# Patient Record
Sex: FEMALE | Race: WHITE | NOT HISPANIC OR LATINO | URBAN - METROPOLITAN AREA
[De-identification: names, ages, dates, MRNs, and addresses within clinical notes are randomized per-mention and may not be internally consistent; named-entity substitution may affect disease eponyms.]

---

## 2017-01-04 ENCOUNTER — APPOINTMENT (OUTPATIENT)
Age: 82
Setting detail: DERMATOLOGY
End: 2017-01-04

## 2017-01-04 ENCOUNTER — RX ONLY (RX ONLY)
Age: 82
End: 2017-01-04

## 2017-01-04 PROBLEM — C44.311 BASAL CELL CARCINOMA OF SKIN OF NOSE: Status: ACTIVE | Noted: 2017-01-04

## 2017-01-04 PROCEDURE — 17312 MOHS ADDL STAGE: CPT

## 2017-01-04 PROCEDURE — OTHER MIPS QUALITY: OTHER

## 2017-01-04 PROCEDURE — 15260 FTH/GFT FR N/E/E/L 20 SQCM/<: CPT

## 2017-01-04 PROCEDURE — 17311 MOHS 1 STAGE H/N/HF/G: CPT

## 2017-01-04 PROCEDURE — OTHER MOHS SURGERY: OTHER

## 2017-01-04 RX ORDER — CEPHALEXIN 500 MG/1
CAPSULE ORAL
Qty: 14 | Refills: 0 | Status: ERX | COMMUNITY
Start: 2017-01-04

## 2017-01-04 NOTE — PROCEDURE: MIPS QUALITY
Quality 358: Patient-Centered Surgical Risk Assessment And Communication: Documentation of patient-specific risk assessment with a risk calculator based on multi-institutional clinical data, the specific risk calculator used, and communication of risk assessment from risk calculator with the patient or family.
Detail Level: Detailed

## 2017-01-11 ENCOUNTER — APPOINTMENT (OUTPATIENT)
Age: 82
Setting detail: DERMATOLOGY
End: 2017-01-11

## 2017-01-11 DIAGNOSIS — Z48.02 ENCOUNTER FOR REMOVAL OF SUTURES: ICD-10-CM

## 2017-01-11 PROCEDURE — OTHER SUTURE REMOVAL (GLOBAL PERIOD): OTHER

## 2017-01-11 PROCEDURE — 99024 POSTOP FOLLOW-UP VISIT: CPT

## 2017-01-11 ASSESSMENT — LOCATION SIMPLE DESCRIPTION DERM: LOCATION SIMPLE: NOSE

## 2017-01-11 ASSESSMENT — LOCATION DETAILED DESCRIPTION DERM: LOCATION DETAILED: NASAL TIP

## 2017-01-11 ASSESSMENT — LOCATION ZONE DERM: LOCATION ZONE: NOSE

## 2017-01-11 NOTE — PROCEDURE: SUTURE REMOVAL (GLOBAL PERIOD)
Add 63642 Cpt? (Important Note: In 2017 The Use Of 98594 Is Being Tracked By Cms To Determine Future Global Period Reimbursement For Global Periods): yes
Detail Level: Detailed

## 2017-02-17 ENCOUNTER — APPOINTMENT (OUTPATIENT)
Age: 82
Setting detail: DERMATOLOGY
End: 2017-02-17

## 2017-02-17 DIAGNOSIS — Z48.01 ENCOUNTER FOR CHANGE OR REMOVAL OF SURGICAL WOUND DRESSING: ICD-10-CM

## 2017-02-17 PROCEDURE — OTHER POST-OP WOUND CHECK: OTHER

## 2017-02-17 PROCEDURE — 99024 POSTOP FOLLOW-UP VISIT: CPT

## 2017-02-17 ASSESSMENT — LOCATION SIMPLE DESCRIPTION DERM: LOCATION SIMPLE: NOSE

## 2017-02-17 ASSESSMENT — LOCATION ZONE DERM: LOCATION ZONE: NOSE

## 2017-02-17 ASSESSMENT — LOCATION DETAILED DESCRIPTION DERM: LOCATION DETAILED: NASAL SUPRATIP

## 2017-02-17 NOTE — PROCEDURE: POST-OP WOUND CHECK
Wound Evaluated By: Dr. Cordova
Add 14171 Cpt? (Important Note: In 2017 The Use Of 19681 Is Being Tracked By Cms To Determine Future Global Period Reimbursement For Global Periods): yes
Detail Level: Detailed

## 2017-04-13 ENCOUNTER — APPOINTMENT (OUTPATIENT)
Age: 82
Setting detail: DERMATOLOGY
End: 2017-04-13

## 2017-04-13 DIAGNOSIS — Z48.01 ENCOUNTER FOR CHANGE OR REMOVAL OF SURGICAL WOUND DRESSING: ICD-10-CM

## 2017-04-13 PROCEDURE — OTHER POST-OP WOUND CHECK: OTHER

## 2017-04-13 PROCEDURE — 99024 POSTOP FOLLOW-UP VISIT: CPT

## 2017-04-13 ASSESSMENT — LOCATION DETAILED DESCRIPTION DERM: LOCATION DETAILED: NASAL DORSUM

## 2017-04-13 ASSESSMENT — LOCATION SIMPLE DESCRIPTION DERM: LOCATION SIMPLE: NOSE

## 2017-04-13 ASSESSMENT — LOCATION ZONE DERM: LOCATION ZONE: NOSE

## 2017-04-13 NOTE — PROCEDURE: POST-OP WOUND CHECK
Add 80998 Cpt? (Important Note: In 2017 The Use Of 90745 Is Being Tracked By Cms To Determine Future Global Period Reimbursement For Global Periods): yes
Detail Level: Detailed
Wound Evaluated By: Dr. Cordova

## 2017-06-19 ENCOUNTER — APPOINTMENT (OUTPATIENT)
Age: 82
Setting detail: DERMATOLOGY
End: 2017-06-19

## 2017-06-19 DIAGNOSIS — Z48.01 ENCOUNTER FOR CHANGE OR REMOVAL OF SURGICAL WOUND DRESSING: ICD-10-CM

## 2017-06-19 PROCEDURE — OTHER POST-OP WOUND CHECK: OTHER

## 2017-06-19 PROCEDURE — 99024 POSTOP FOLLOW-UP VISIT: CPT

## 2017-06-19 ASSESSMENT — LOCATION ZONE DERM: LOCATION ZONE: NOSE

## 2017-06-19 ASSESSMENT — LOCATION DETAILED DESCRIPTION DERM: LOCATION DETAILED: NASAL SUPRATIP

## 2017-06-19 ASSESSMENT — LOCATION SIMPLE DESCRIPTION DERM: LOCATION SIMPLE: NOSE

## 2017-06-19 NOTE — PROCEDURE: POST-OP WOUND CHECK
Detail Level: Detailed
Add 72603 Cpt? (Important Note: In 2017 The Use Of 09148 Is Being Tracked By Cms To Determine Future Global Period Reimbursement For Global Periods): yes
Wound Evaluated By: Dr. Cordova

## 2017-11-06 ENCOUNTER — APPOINTMENT (OUTPATIENT)
Age: 82
Setting detail: DERMATOLOGY
End: 2017-11-06

## 2017-11-06 DIAGNOSIS — Z48.01 ENCOUNTER FOR CHANGE OR REMOVAL OF SURGICAL WOUND DRESSING: ICD-10-CM

## 2017-11-06 PROCEDURE — 99024 POSTOP FOLLOW-UP VISIT: CPT

## 2017-11-06 PROCEDURE — OTHER POST-OP WOUND CHECK: OTHER

## 2017-11-06 PROCEDURE — OTHER COUNSELING: OTHER

## 2017-11-06 ASSESSMENT — LOCATION DETAILED DESCRIPTION DERM: LOCATION DETAILED: NASAL SUPRATIP

## 2017-11-06 ASSESSMENT — LOCATION ZONE DERM: LOCATION ZONE: NOSE

## 2017-11-06 ASSESSMENT — LOCATION SIMPLE DESCRIPTION DERM: LOCATION SIMPLE: NOSE

## 2017-11-06 NOTE — PROCEDURE: POST-OP WOUND CHECK
Wound Evaluated By: Dr. Cordova
Add 99314 Cpt? (Important Note: In 2017 The Use Of 63536 Is Being Tracked By Cms To Determine Future Global Period Reimbursement For Global Periods): yes
Detail Level: Detailed

## 2020-10-07 NOTE — PROCEDURE: MOHS SURGERY
Returned patient's mothers call. She states she has been complaining of a \"heaviness\" in her chest that is intermittent for the past 3-4 days. Mom states several of her roommates tested positive for COVID-19 about 1 month ago. Tano had all of the symptoms (fever, cough, loss and taste and smell) at that time as well, but decided not to get tested and just quarantined assuming that she had it. She denies a fever, cough, shortness of breath and all other symptoms at this time. She has not had any recent chest injuries. Writer offered her an earlier appointment today but she denied at this time. Appointment kept for 1:00 PM today with Dr. Charles. If symptoms worsen or if new, concerning symptoms develop she will bring her in for an earlier appointment. All questions were answered at this time.    Consent (Scalp)/Introductory Paragraph: The rationale for Mohs was explained to the patient and consent was obtained. The risks, benefits and alternatives to therapy were discussed in detail. Specifically, the risks of changes in hair growth pattern secondary to repair, infection, scarring, bleeding, prolonged wound healing, incomplete removal, allergy to anesthesia, nerve injury and recurrence were addressed. Prior to the procedure, the treatment site was clearly identified and confirmed by the patient. All components of Universal Protocol/PAUSE Rule completed.

## 2021-05-25 NOTE — PROCEDURE: MOHS SURGERY
by discharge Consent (Nose)/Introductory Paragraph: The rationale for Mohs was explained to the patient and consent was obtained. The risks, benefits and alternatives to therapy were discussed in detail. Specifically, the risks of nasal deformity, changes in the flow of air through the nose, infection, scarring, bleeding, prolonged wound healing, incomplete removal, allergy to anesthesia, nerve injury and recurrence were addressed. Prior to the procedure, the treatment site was clearly identified and confirmed by the patient. All components of Universal Protocol/PAUSE Rule completed.

## 2024-05-17 ENCOUNTER — OFFICE VISIT (OUTPATIENT)
Age: 89
End: 2024-05-17
Payer: COMMERCIAL

## 2024-05-17 VITALS
SYSTOLIC BLOOD PRESSURE: 137 MMHG | BODY MASS INDEX: 35.33 KG/M2 | HEIGHT: 62 IN | RESPIRATION RATE: 16 BRPM | OXYGEN SATURATION: 98 % | WEIGHT: 192 LBS | HEART RATE: 82 BPM | DIASTOLIC BLOOD PRESSURE: 60 MMHG

## 2024-05-17 DIAGNOSIS — R51.9 LEFT-SIDED FACE PAIN: Primary | ICD-10-CM

## 2024-05-17 PROCEDURE — 1123F ACP DISCUSS/DSCN MKR DOCD: CPT | Performed by: PSYCHIATRY & NEUROLOGY

## 2024-05-17 PROCEDURE — 99204 OFFICE O/P NEW MOD 45 MIN: CPT | Performed by: PSYCHIATRY & NEUROLOGY

## 2024-05-17 RX ORDER — BENZONATATE 100 MG/1
100 CAPSULE ORAL 3 TIMES DAILY PRN
COMMUNITY

## 2024-05-17 RX ORDER — LAMOTRIGINE 25 MG/1
25 TABLET ORAL DAILY
COMMUNITY

## 2024-05-17 RX ORDER — LEVOTHYROXINE SODIUM 0.05 MG/1
50 TABLET ORAL DAILY
COMMUNITY

## 2024-05-17 RX ORDER — ASPIRIN 81 MG/1
81 TABLET ORAL DAILY
COMMUNITY

## 2024-05-17 RX ORDER — GABAPENTIN 100 MG/1
100 CAPSULE ORAL 2 TIMES DAILY
COMMUNITY

## 2024-05-17 RX ORDER — DOXYCYCLINE HYCLATE 100 MG/1
100 CAPSULE ORAL 2 TIMES DAILY
COMMUNITY

## 2024-05-17 RX ORDER — MECLIZINE HCL 25MG 25 MG/1
25 TABLET, CHEWABLE ORAL 3 TIMES DAILY PRN
COMMUNITY

## 2024-05-17 RX ORDER — METOPROLOL SUCCINATE 25 MG/1
25 TABLET, EXTENDED RELEASE ORAL DAILY
COMMUNITY

## 2024-05-17 RX ORDER — SACUBITRIL AND VALSARTAN 24; 26 MG/1; MG/1
1 TABLET, FILM COATED ORAL 2 TIMES DAILY
COMMUNITY

## 2024-05-17 RX ORDER — FLUTICASONE PROPIONATE 50 MCG
1 SPRAY, SUSPENSION (ML) NASAL DAILY
COMMUNITY

## 2024-05-17 ASSESSMENT — PATIENT HEALTH QUESTIONNAIRE - PHQ9
SUM OF ALL RESPONSES TO PHQ QUESTIONS 1-9: 0
SUM OF ALL RESPONSES TO PHQ9 QUESTIONS 1 & 2: 0
2. FEELING DOWN, DEPRESSED OR HOPELESS: NOT AT ALL
SUM OF ALL RESPONSES TO PHQ QUESTIONS 1-9: 0
1. LITTLE INTEREST OR PLEASURE IN DOING THINGS: NOT AT ALL

## 2024-05-17 NOTE — PROGRESS NOTES
[facility] at [facility] at [address] [place] [id number] [facility] [address] [place] [address] [phone] Office  [phone] Facsimile           Referring: Self    Chief Complaint   Patient presents with    New Patient     Referred by PCP - states appt was made by her PCP    Trigeminal Neuralgia     Gabapentin, lamotrigine// sx x 10 mo // L side sharp shooting pains    88-year-old lady here for left-sided facial pain.  She is a bit of a difficult historian.  She is accompanied by her friend.  As I understand it in November of last year she began to feel this abrupt shocking sensation around her left eye going down into the face and cheek region.  At times it can go up towards the ear.  She had no sharp shooting pain in a V1, V2 or V3 distribution.  This can happen throughout the day.  It would awaken her at night.  She has some tearing of the eye with it. No injection of the eye.  She has been on Neurontin 100 mg twice daily and Lamictal 25 mg daily and she says after she got on those medicines the pain stopped.  Last week she started to get a symptom of a sinus infection.  She is being treated for that.  She says with that the pain started to come up again.  She notes she was unable to sleep last night as she would have shooting pain left side of the face as described with any type of movement.  Coincidentally she also was unable to get her Neurontin refilled last week due to an error with the prescription so she has been out of her medication x 1 week, about the same time duration as her worsening of the facial pain.  No focal complaint.    Records were kindly forwarded by Dr. Marquez's office.  She was referred for trigeminal neuralgia said to be on Neurontin and Lamictal.  Question appointment scheduled in error here as the referral notes that she was to be seen by

## 2024-06-07 ENCOUNTER — HOSPITAL ENCOUNTER (OUTPATIENT)
Facility: HOSPITAL | Age: 89
Discharge: HOME | End: 2024-06-07
Attending: PSYCHIATRY & NEUROLOGY
Payer: COMMERCIAL

## 2024-06-07 DIAGNOSIS — R51.9 LEFT-SIDED FACE PAIN: ICD-10-CM

## 2024-06-07 PROCEDURE — 6360000004 HC RX CONTRAST MEDICATION: Performed by: PSYCHIATRY & NEUROLOGY

## 2024-06-07 PROCEDURE — A9579 GAD-BASE MR CONTRAST NOS,1ML: HCPCS | Performed by: PSYCHIATRY & NEUROLOGY

## 2024-06-07 PROCEDURE — 70553 MRI BRAIN STEM W/O & W/DYE: CPT

## 2024-06-07 RX ADMIN — GADOTERIDOL 18 ML: 279.3 INJECTION, SOLUTION INTRAVENOUS at 15:37

## 2024-06-17 ENCOUNTER — TELEPHONE (OUTPATIENT)
Age: 89
End: 2024-06-17

## 2024-06-17 NOTE — TELEPHONE ENCOUNTER
Patient is returning missed call. States to please not leave a VM because she is having trouble with it.     Please contact.